# Patient Record
Sex: FEMALE | Race: WHITE | NOT HISPANIC OR LATINO | URBAN - METROPOLITAN AREA
[De-identification: names, ages, dates, MRNs, and addresses within clinical notes are randomized per-mention and may not be internally consistent; named-entity substitution may affect disease eponyms.]

---

## 2020-10-02 ENCOUNTER — LAB OUTSIDE AN ENCOUNTER (OUTPATIENT)
Dept: URBAN - METROPOLITAN AREA CLINIC 23 | Facility: CLINIC | Age: 65
End: 2020-10-02

## 2020-10-03 ENCOUNTER — LAB OUTSIDE AN ENCOUNTER (OUTPATIENT)
Dept: URBAN - METROPOLITAN AREA CLINIC 23 | Facility: CLINIC | Age: 65
End: 2020-10-03

## 2020-10-05 ENCOUNTER — LAB OUTSIDE AN ENCOUNTER (OUTPATIENT)
Dept: URBAN - METROPOLITAN AREA CLINIC 23 | Facility: CLINIC | Age: 65
End: 2020-10-05

## 2020-10-07 ENCOUNTER — LAB OUTSIDE AN ENCOUNTER (OUTPATIENT)
Dept: URBAN - METROPOLITAN AREA CLINIC 23 | Facility: CLINIC | Age: 65
End: 2020-10-07

## 2020-10-07 LAB
ADENOVIRUS F 40/41: (no result)
ASTROVIRUS: (no result)
C DIFF CONSIST: (no result)
C DIFF QC: (no result)
CAMPYLOBACTER: (no result)
CLOSTRIDIUM DIFFICILE PREVIOUS TEST DATE: (no result)
CLOSTRIDIUM DIFFICILE TOXIN/ANTIGEN: NOT DETECTED
CRYPTOSPORIDIUM: (no result)
CYCLOSPORA: (no result)
E COLI O157: (no result)
ENTAMOEBA HISTOLYTICA: (no result)
ENTEROAGGREGATIVE E COLI: (no result)
ENTEROPATHOGENIC E COLI: (no result)
ENTEROTOXIGENIC E COLI: (no result)
GIARDIA LAMBLIA: (no result)
GIPCR PREVIOUS TEST DATE: (no result)
GIPCR SPECIMEN CONSISTENCY: (no result)
NOROVIRUS GI/GII: (no result)
PERFORMING LAB: (no result)
PLESIOMONAS SHIGELLOIDES: (no result)
ROTAVIRUS A: (no result)
SALMONELLA: (no result)
SAPOVIRUS: (no result)
SHIGA LIKE TOXIN: (no result)
SHIGELLA/ENTEROINVASIVE E COLI: (no result)
VIBRIO CHOLERA: (no result)
VIBRIO: (no result)
WBC FECAL: (no result)
YERSINIA ENTEROLITICA: (no result)

## 2020-10-08 LAB
OAP SOURCE: (no result)
PERFORMING LAB: (no result)

## 2020-10-13 ENCOUNTER — OFFICE VISIT (OUTPATIENT)
Dept: URBAN - METROPOLITAN AREA CLINIC 78 | Facility: CLINIC | Age: 65
End: 2020-10-13

## 2021-01-23 ENCOUNTER — TELEPHONE ENCOUNTER (OUTPATIENT)
Dept: URBAN - METROPOLITAN AREA CLINIC 92 | Facility: CLINIC | Age: 66
End: 2021-01-23

## 2021-01-23 PROBLEM — 19311003 COLLAGENOUS COLITIS: Status: ACTIVE | Noted: 2021-01-23

## 2021-01-23 RX ORDER — BUDESONIDE 3 MG/1
3 CAPSULES CAPSULE, COATED PELLETS ORAL ONCE A DAY
Qty: 180 CAPSULES | Refills: 1 | OUTPATIENT
Start: 2021-01-23

## 2021-09-09 ENCOUNTER — WEB ENCOUNTER (OUTPATIENT)
Dept: URBAN - METROPOLITAN AREA CLINIC 12 | Facility: CLINIC | Age: 66
End: 2021-09-09

## 2021-09-09 ENCOUNTER — DASHBOARD ENCOUNTERS (OUTPATIENT)
Age: 66
End: 2021-09-09

## 2021-09-09 ENCOUNTER — OFFICE VISIT (OUTPATIENT)
Dept: URBAN - METROPOLITAN AREA CLINIC 12 | Facility: CLINIC | Age: 66
End: 2021-09-09
Payer: MEDICARE

## 2021-09-09 VITALS
WEIGHT: 167.4 LBS | DIASTOLIC BLOOD PRESSURE: 80 MMHG | HEART RATE: 92 BPM | SYSTOLIC BLOOD PRESSURE: 153 MMHG | HEIGHT: 66 IN | TEMPERATURE: 97.1 F | BODY MASS INDEX: 26.9 KG/M2

## 2021-09-09 DIAGNOSIS — Z79.01 CHRONIC ANTICOAGULATION: ICD-10-CM

## 2021-09-09 DIAGNOSIS — K64.4 EXTERNAL HEMORRHOID: ICD-10-CM

## 2021-09-09 DIAGNOSIS — K62.89 PERIANAL PAIN: ICD-10-CM

## 2021-09-09 DIAGNOSIS — K62.5 RECTAL BLEEDING: ICD-10-CM

## 2021-09-09 PROBLEM — 711150003: Status: ACTIVE | Noted: 2021-09-09

## 2021-09-09 PROCEDURE — 99204 OFFICE O/P NEW MOD 45 MIN: CPT | Performed by: STUDENT IN AN ORGANIZED HEALTH CARE EDUCATION/TRAINING PROGRAM

## 2021-09-09 RX ORDER — HYDROCORTISONE 25 MG/G
1 APPLICATION CREAM TOPICAL TWICE A DAY
Qty: 1 TUBE | Refills: 3 | OUTPATIENT
Start: 2021-09-09 | End: 2021-11-04

## 2021-09-09 RX ORDER — APIXABAN 5 MG/1
AS DIRECTED TABLET, FILM COATED ORAL
Status: ACTIVE | COMMUNITY

## 2021-09-09 RX ORDER — PALBOCICLIB 125 MG/1
1 CAPSULE WITH FOOD CAPSULE ORAL ONCE A DAY
Status: ACTIVE | COMMUNITY

## 2021-09-09 RX ORDER — LISINOPRIL 5 MG/1
1 TABLET TABLET ORAL ONCE A DAY
Status: ACTIVE | COMMUNITY

## 2021-09-09 RX ORDER — BUDESONIDE 3 MG/1
3 CAPSULES CAPSULE, COATED PELLETS ORAL ONCE A DAY
Qty: 180 CAPSULES | Refills: 1 | Status: ON HOLD | COMMUNITY
Start: 2021-01-23

## 2021-09-09 NOTE — HPI-TODAY'S VISIT:
65 yo F with stage IV metastatic breast CA here for evaluation of perianal pain.; Symptoms began appx 1 week ago when she was on vacation in [place], [place]. She says it hurts when she passes gas or has a bowel movement.  She has had more rectal bleeding recently, and wears a panty liner for this.  She is taking Eliquis for DVT. She has a history of Cdiff for which she took Vancomycin pulsed taper, no longer having loose stool. She has tried preparation H without relief.  Colonoscopy by Dr. Villanueva -- unable to reach cecum due to strictures from extrinsic compression, despite using gastroscope. She had IH and EH.

## 2023-07-24 ENCOUNTER — OUT OF OFFICE VISIT (OUTPATIENT)
Dept: URBAN - METROPOLITAN AREA MEDICAL CENTER 27 | Facility: MEDICAL CENTER | Age: 68
End: 2023-07-24
Payer: MEDICARE

## 2023-07-24 DIAGNOSIS — R11.2 ACUTE NAUSEA WITH NONBILIOUS VOMITING: ICD-10-CM

## 2023-07-24 DIAGNOSIS — R18.8 ABDOMINAL ASCITES: ICD-10-CM

## 2023-07-24 DIAGNOSIS — R09.89 ABDOMINAL AORTIC PULSATION: ICD-10-CM

## 2023-07-24 DIAGNOSIS — E87.1 ACUTE HYPONATREMIA: ICD-10-CM

## 2023-07-24 DIAGNOSIS — R13.10 ABNORMAL DEGLUTITION: ICD-10-CM

## 2023-07-24 PROCEDURE — 99232 SBSQ HOSP IP/OBS MODERATE 35: CPT | Performed by: INTERNAL MEDICINE

## 2023-07-24 PROCEDURE — G8427 DOCREV CUR MEDS BY ELIG CLIN: HCPCS | Performed by: INTERNAL MEDICINE

## 2023-07-24 PROCEDURE — 99222 1ST HOSP IP/OBS MODERATE 55: CPT | Performed by: INTERNAL MEDICINE

## 2023-07-25 ENCOUNTER — LAB OUTSIDE AN ENCOUNTER (OUTPATIENT)
Dept: URBAN - METROPOLITAN AREA CLINIC 23 | Facility: CLINIC | Age: 68
End: 2023-07-25

## 2023-07-25 ENCOUNTER — OUT OF OFFICE VISIT (OUTPATIENT)
Dept: URBAN - METROPOLITAN AREA MEDICAL CENTER 27 | Facility: MEDICAL CENTER | Age: 68
End: 2023-07-25
Payer: MEDICARE

## 2023-07-25 DIAGNOSIS — R11.2 ACUTE NAUSEA WITH NONBILIOUS VOMITING: ICD-10-CM

## 2023-07-25 DIAGNOSIS — K21.00 ALKALINE REFLUX ESOPHAGITIS: ICD-10-CM

## 2023-07-25 DIAGNOSIS — R13.10 ABNORMAL DEGLUTITION: ICD-10-CM

## 2023-07-25 PROCEDURE — 43235 EGD DIAGNOSTIC BRUSH WASH: CPT | Performed by: INTERNAL MEDICINE
